# Patient Record
Sex: MALE | Race: WHITE | NOT HISPANIC OR LATINO | Employment: UNEMPLOYED | URBAN - METROPOLITAN AREA
[De-identification: names, ages, dates, MRNs, and addresses within clinical notes are randomized per-mention and may not be internally consistent; named-entity substitution may affect disease eponyms.]

---

## 2018-10-17 ENCOUNTER — ANESTHESIA EVENT (OUTPATIENT)
Dept: SURGERY | Facility: HOSPITAL | Age: 43
End: 2018-10-17
Payer: MEDICAID

## 2018-10-17 NOTE — H&P
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 He was treated at an outside institution. He has brought the injury x-rays with him today. The injury x-rays show that he had sustained an anterior fracture dislocation of both shoulders with involvement of the greater tuberosity. It appears that he underwent closed reduction and percutaneous pinning of both shoulders. Pins were subsequently removed about 4 weeks after the injury. He presents today with pain worse on the right side. The pain is approximately 10 out of 10 in severity. In addition to pain, he states that he has no use with the right arm. He does admit to some numbness in the right upper extremity. The left shoulder is also painful but at least he has some function using it. Things that make his pain worse include moving, lying down, sitting up, and the use of the right arm. Past treatment has included surgery as mentioned previously as well as physical therapy.     Past workup include EMG/nerve conduction studies, CT scan, and MRI. There has not been any change in his condition. He reports at 9 out of 10 pain level today. He continues to report inability to move his right upper extremity.     Current Meds    Medication Name Instruction   Baclofen 20 MG Oral Tablet TAKE 1 TABLET 4 TIMES DAILY.   FLUoxetine HCl - 20 MG Oral Capsule TAKE 1 CAPSULE DAILY   OXcarbazepine 600 MG Oral Tablet TAKE 1 TABLET TWICE DAILY.     Vitals   Recorded: 18Apr2018 03:19PM   Height 6 ft 3 in   Weight 314 lb    BMI Calculated 39.25   BSA Calculated 2.66   Heart Rate 96   Pain Scale 9   Location: R shoulders/ neck     Physical Exam    General: Alert male, no acute distress. Appears stated age of 42 years old.    Skin: Intact over both shoulders. No rashes or cellulitis is present.    Cervical exam: Range of motion is within normal  limits. Negative Spurling's maneuver bilaterally.    Right shoulder: Upon inspection of the right shoulder, he has a protracted posture. Range of motion: No active elevation, passive elevation to 170, external rotation 10°, internal rotation to the PSIS. 2 out of 5 strength in elevation and external rotation. 4 out of 5 strength in internal rotation. 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    Left shoulder: No visible deformity or atrophy. Range of motion: Active elevation 110, passive elevation 170, external rotation at the side 30°, internal rotation T12. Rotator cuff strength is 4 out of 5 in elevation, external rotation. 5 out of 5 strength in internal rotation.    Neurologic: Gross light touch is intact in the distributions of the axillary, musculocutaneous, radial, ulnar, and median distributions. With testing of his deltoid, he has weakness of 4 out of 5 with abduction and posterior resistance.     Vascular: 2+ radial pulse right wrist     Assessment   1. Chronic pain of both shoulders (M25.511,M25.512,G89.29)   2. Displaced fracture of greater tuberosity of right humerus with malunion (S42.251P)   3. Other instability, right shoulder (M25.311)   4. 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 Rotator cuff insufficiency of left shoulder (M25.312)    Plan    To OR for operative management, possible exploration of axillary and suprascapular nerve, Right      There are no interval changes to report in the history and physical exam.

## 2018-10-18 ENCOUNTER — ANESTHESIA (OUTPATIENT)
Dept: SURGERY | Facility: HOSPITAL | Age: 43
End: 2018-10-18
Payer: MEDICAID

## 2018-10-18 ENCOUNTER — HOSPITAL ENCOUNTER (OUTPATIENT)
Facility: HOSPITAL | Age: 43
Discharge: HOME | End: 2018-10-18
Attending: ORTHOPAEDIC SURGERY | Admitting: ORTHOPAEDIC SURGERY
Payer: MEDICAID

## 2018-10-18 VITALS
HEART RATE: 81 BPM | TEMPERATURE: 99 F | DIASTOLIC BLOOD PRESSURE: 79 MMHG | SYSTOLIC BLOOD PRESSURE: 139 MMHG | BODY MASS INDEX: 37.3 KG/M2 | WEIGHT: 300 LBS | HEIGHT: 75 IN | OXYGEN SATURATION: 96 % | RESPIRATION RATE: 16 BRPM

## 2018-10-18 DIAGNOSIS — G54.0 DISORDER OF RIGHT AXILLARY NERVE: Primary | ICD-10-CM

## 2018-10-18 DIAGNOSIS — Z01.818 PREOP TESTING: ICD-10-CM

## 2018-10-18 LAB
ANION GAP SERPL CALC-SCNC: 7 MMOL/L
BASOPHILS # BLD AUTO: 0.03 K/UL
BASOPHILS NFR BLD: 0.4 %
BUN SERPL-MCNC: 9 MG/DL
CALCIUM SERPL-MCNC: 9.2 MG/DL
CHLORIDE SERPL-SCNC: 105 MMOL/L
CO2 SERPL-SCNC: 28 MMOL/L
CREAT SERPL-MCNC: 1 MG/DL
DIFFERENTIAL METHOD: NORMAL
EOSINOPHIL # BLD AUTO: 0.1 K/UL
EOSINOPHIL NFR BLD: 0.8 %
ERYTHROCYTE [DISTWIDTH] IN BLOOD BY AUTOMATED COUNT: 12.4 %
EST. GFR  (AFRICAN AMERICAN): >60 ML/MIN/1.73 M^2
EST. GFR  (NON AFRICAN AMERICAN): >60 ML/MIN/1.73 M^2
GLUCOSE SERPL-MCNC: 105 MG/DL
HCT VFR BLD AUTO: 48.6 %
HGB BLD-MCNC: 16.3 G/DL
LYMPHOCYTES # BLD AUTO: 1.9 K/UL
LYMPHOCYTES NFR BLD: 25 %
MCH RBC QN AUTO: 29.6 PG
MCHC RBC AUTO-ENTMCNC: 33.5 G/DL
MCV RBC AUTO: 88 FL
MONOCYTES # BLD AUTO: 1 K/UL
MONOCYTES NFR BLD: 12.6 %
NEUTROPHILS # BLD AUTO: 4.7 K/UL
NEUTROPHILS NFR BLD: 60.7 %
PLATELET # BLD AUTO: 217 K/UL
PMV BLD AUTO: 9.9 FL
POTASSIUM SERPL-SCNC: 4.3 MMOL/L
RBC # BLD AUTO: 5.5 M/UL
SODIUM SERPL-SCNC: 140 MMOL/L
WBC # BLD AUTO: 7.67 K/UL

## 2018-10-18 PROCEDURE — 85025 COMPLETE CBC W/AUTO DIFF WBC: CPT

## 2018-10-18 PROCEDURE — 80048 BASIC METABOLIC PNL TOTAL CA: CPT

## 2018-10-18 PROCEDURE — 36415 COLL VENOUS BLD VENIPUNCTURE: CPT

## 2018-10-18 PROCEDURE — 93005 ELECTROCARDIOGRAM TRACING: CPT

## 2018-10-18 RX ORDER — PREGABALIN 75 MG/1
150 CAPSULE ORAL
Status: DISCONTINUED | OUTPATIENT
Start: 2018-10-18 | End: 2018-10-18 | Stop reason: HOSPADM

## 2018-10-18 RX ORDER — CELECOXIB 100 MG/1
400 CAPSULE ORAL
Status: DISCONTINUED | OUTPATIENT
Start: 2018-10-18 | End: 2018-10-18 | Stop reason: HOSPADM

## 2018-10-18 RX ORDER — FLUOXETINE 20 MG/1
20 TABLET ORAL NIGHTLY
COMMUNITY

## 2018-10-18 RX ORDER — BACLOFEN 20 MG/1
20 TABLET ORAL 4 TIMES DAILY
COMMUNITY

## 2018-10-18 RX ORDER — CEFAZOLIN SODIUM 2 G/50ML
2 SOLUTION INTRAVENOUS ONCE
Status: DISCONTINUED | OUTPATIENT
Start: 2018-10-18 | End: 2018-10-18 | Stop reason: HOSPADM

## 2018-10-18 RX ORDER — OXCARBAZEPINE 600 MG/1
150 TABLET, FILM COATED ORAL 2 TIMES DAILY
COMMUNITY

## 2018-10-18 RX ORDER — ACETAMINOPHEN 500 MG
1000 TABLET ORAL
Status: DISCONTINUED | OUTPATIENT
Start: 2018-10-18 | End: 2018-10-18 | Stop reason: HOSPADM

## 2018-10-18 NOTE — ANESTHESIA PREPROCEDURE EVALUATION
10/18/2018  Darek Vizcarra is a 43 y.o., male.    Anesthesia Evaluation      I have reviewed the Medications.     Review of Systems  Anesthesia Hx:  No problems with previous Anesthesia Denies Hx of Anesthetic complications History of prior surgery of interest to airway management or planning: Previous anesthesia: General Denies Family Hx of Anesthesia complications.   Denies Personal Hx of Anesthesia complications.   Social:  Non-Smoker, Alcohol Use    Hematology/Oncology:  Hematology Normal   Oncology Normal     EENT/Dental:EENT/Dental Normal   Cardiovascular:  Cardiovascular Normal Exercise tolerance: good     Pulmonary:  Pulmonary Normal    Renal/:  Renal/ Normal     Hepatic/GI:  Hepatic/GI Normal    Musculoskeletal:  Musculoskeletal Normal    Neurological:   Neuromuscular Disease,    Endocrine:  Endocrine Normal    Dermatological:  Skin Normal    Psych:  Psychiatric Normal           Physical Exam  General:  Well nourished, Large Beard    Airway/Jaw/Neck:  Airway Findings: Mouth Opening: Normal Tongue: Normal  General Airway Assessment: Adult  Mallampati: II      Dental:  Dental Findings: In tact   Chest/Lungs:  Chest/Lungs Findings: Clear to auscultation, Normal Respiratory Rate     Heart/Vascular:  Heart Findings: Rate: Normal  Rhythm: Regular Rhythm        Mental Status:  Mental Status Findings:  Cooperative, Alert and Oriented         Anesthesia Plan  Type of Anesthesia, risks & benefits discussed:  Anesthesia Type:  general  Patient's Preference: general  Intra-op Monitoring Plan:   Intra-op Monitoring Plan Comments:   Post Op Pain Control Plan:   Post Op Pain Control Plan Comments:   Induction:   IV  Beta Blocker:  Patient is not currently on a Beta-Blocker (No further documentation required).       Informed Consent: Patient understands risks and agrees with Anesthesia plan.  Questions  answered. Anesthesia consent signed with patient.  ASA Score: 2     Day of Surgery Review of History & Physical:            Ready For Surgery From Anesthesia Perspective.

## 2018-10-18 NOTE — PLAN OF CARE
Case cancelled per Dr Dillon do to insufficent OR space, patient notified and given information about rescheduling, Dr Dillon spoke with patient himself. Patient was very understanding, meal vouchers given and Carolina Turpin contacted to see if she could speak with patient to try and help him out with expenses from traveling from out of town

## 2018-10-18 NOTE — DISCHARGE SUMMARY
Physician Discharge Summary     Patient ID:      Admit date: 10/18/2018    Discharge date: Same    Admitting Physician: Cayden Dillon    Discharge Physician: Same    Admission Diagnoses: Right greater tuberosity fracture malunion with axillary nerve deficit    Discharge Diagnoses: Same    Admission Condition: good    Discharged Condition: good    Indication for Admission: Scheduled for outpatient surgery but cancelled    Hospital Course: Patient presented for his surgery but his surgery was bumped due to an emergency and he had to be rescheduled for next week    Consults: None    Significant Diagnostic Studies: None    Treatments: None    Discharge Exam:  Same as preop    Disposition: Home or Self Care    Patient Instructions:     Discharge Medications  Refer to Discharge Medication List    Activity: activity as tolerated  Diet: regular diet  Wound Care: none needed    Discussed plan with patient and answered questions: Yes        Signed:  Champ Weems  (10/18/2018)